# Patient Record
Sex: FEMALE | Race: BLACK OR AFRICAN AMERICAN | URBAN - METROPOLITAN AREA
[De-identification: names, ages, dates, MRNs, and addresses within clinical notes are randomized per-mention and may not be internally consistent; named-entity substitution may affect disease eponyms.]

---

## 2018-05-01 ENCOUNTER — HISTORICAL (OUTPATIENT)
Dept: ADMINISTRATIVE | Facility: HOSPITAL | Age: 38
End: 2018-05-01

## 2018-05-01 LAB
HAV IGM SERPL QL IA: NONREACTIVE
HBV CORE IGM SERPL QL IA: NONREACTIVE
HBV SURFACE AG SERPL QL IA: NEGATIVE
HCV AB SERPL QL IA: NONREACTIVE
HIV 1+2 AB+HIV1 P24 AG SERPL QL IA: NONREACTIVE
POC BETA-HCG (QUAL): NEGATIVE
T PALLIDUM AB SER QL: NONREACTIVE

## 2018-08-09 LAB — POC BETA-HCG (QUAL): NEGATIVE

## 2019-09-11 LAB
BILIRUB SERPL-MCNC: NEGATIVE MG/DL
BLOOD URINE, POC: NEGATIVE
CLARITY, POC UA: NORMAL
COLOR, POC UA: YELLOW
GLUCOSE UR QL STRIP: NEGATIVE
KETONES UR QL STRIP: NEGATIVE
LEUKOCYTE EST, POC UA: NEGATIVE
NITRITE, POC UA: NEGATIVE
PH, POC UA: 6
POC BETA-HCG (QUAL): NEGATIVE
PROTEIN, POC: NEGATIVE
SPECIFIC GRAVITY, POC UA: 1
UROBILINOGEN, POC UA: NORMAL

## 2019-10-25 LAB — POC BETA-HCG (QUAL): NEGATIVE

## 2020-09-14 LAB — POC BETA-HCG (QUAL): NEGATIVE

## 2022-04-10 ENCOUNTER — HISTORICAL (OUTPATIENT)
Dept: ADMINISTRATIVE | Facility: HOSPITAL | Age: 42
End: 2022-04-10

## 2022-04-28 VITALS
SYSTOLIC BLOOD PRESSURE: 130 MMHG | DIASTOLIC BLOOD PRESSURE: 83 MMHG | BODY MASS INDEX: 33.49 KG/M2 | HEIGHT: 67 IN | OXYGEN SATURATION: 100 % | WEIGHT: 213.38 LBS

## 2022-05-04 NOTE — HISTORICAL [facility] CERNER
This is a historical note converted from [facility]. Formatting and pictures may have been removed.  Please reference [facility]bert for original formatting and attached multimedia. Chief Complaint  annual desires to start depo  History of Present Illness  The patient? here for annual exam.?Her?LMP was 18. Period last?3 days and changes pads 4x/day. Denies history of abnormal paps. Last pap was greater than 3 years ago. Denies breast or urinary complaints.?Denies pelvic pain, abnormal bleeding or discharge. Pt reports no STIs in the past, desires STI testing.?Pt currently not on contraception but desires Depo today. Pt states that she has not been sexually active since LMP. Pt states she was on Depo in the past and amenorrheic, no complaints and would like to start again. Pt c/o of hemorrhoids that currently dont hurt but become inflamed when she eats spicy crawfish. Denies constipation, denies bleeding, pain or pruritus, but admits to not drinking a lot of water. Denies using any OTC medications currently. Denies tobacco and ETOH use. Negative medical and surgery history.?Dep. screening-0. Denies fly hx of breast, ovarian, uterine or colon cancer.  Review of Systems  CONSTITUTIONAL:??No weight loss, fever, chills, weakness or fatigue.  BREAST: No lumps or masses or pain, no nipple discharge or inversion  GI:??No nausea, vomiting or?abdominal pain.  :??No dysuria, frequency or urgency.?  GYN: No abnormal discharge, itching, bleeding, pelvic pain.  NEUROLOGIC:??No dizziness or confusion.  Physical Exam  Vitals & Measurements  T:?36.6? ?C (Oral)? HR:?75(Peripheral)? RR:?18? BP:?123/82?  HT:?172?cm? HT:?172?cm? WT:?82.4?kg? WT:?82.4?kg? BMI:?27.85?  GENERAL: Alert and oriented x3.?No apparent distress.  BREAST: No mass, tenderness or discharge.?  ABDOMEN: Soft, nontender, and nondistended.??  PELVIC:?  Labia: No erythema or lesions.  Vagina: pink, no abnormal discharge.  Cervix: Pink, no cervical motion tenderness.  Blood at cervical os.  Uterus: Mobile, non-tender, no masses palpated.  Adnexa: Non-tender, no fullness.  Anus: multiple external hemorrhoids protruding out of anal canal, no thrombosed appearance.  INTEGUMENTARY:?Warm and dry.  NEUROLOGIC: She is alert and oriented x3.?  PSYCHIATRIC:?Cooperative, appropriate mood and affect.  Assessment/Plan  1.?Pap smear for cervical cancer screening  ? Pap today.  RTC 1 year.  Ordered:  Clinic Follow up, *Est. 05/01/19 3:00:00 CDT, 1 Year, Order for future visit, Pap smear for cervical cancer screening, [facility]  Pathology Gyn Request, 05/01/18 9:44:00 CDT, AP Specimen, Thin Prep Pap Cervical-Auto/man screen, Routine GYN/Pap, Cervical, Thin Prep with HPV Probe, Normal, 05/25/18, Previous Pap, >3 years, None, Previous Pregnancy, Cervix Present, Routine, Collected, RT - Routine, Hold...  Preventative Health Care Est 18-39 years [id number] PC, Pap smear for cervical cancer screening, [facility], 05/01/18 9:44:00 CDT  ?  2.?Routine screening for STI (sexually transmitted infection)  ? G/C and wet prep  RPR, HIV and Hep  Ordered:  Chlamydia trach and N. gonorrhea PCR, Routine collect, Cervical, Order for future visit, 05/01/18 9:44:00 CDT, Stop date 05/01/18 9:44:00 CDT, Nurse collect, Routine screening for STI (sexually transmitted infection)  Hepatitis Panel [facility], Routine collect, 05/01/18 9:44:00 CDT, Blood, Order for future visit, Stop date 05/01/18 9:44:00 CDT, Lab Collect, Routine screening for STI (sexually transmitted infection), 05/01/18 9:44:00 CDT  HIV 1 and 2, Now collect, 05/01/18 9:44:00 CDT, Blood, Order for future visit, Stop date 05/01/18 9:44:00 CDT, Lab Collect, Routine screening for STI (sexually transmitted infection), 05/01/18 9:44:00 CDT  Syphilis Antibody (with Reflex RPR), Routine collect, 05/01/18 9:44:00 CDT, Blood, Order for future visit, Stop date 05/01/18 9:44:00 CDT, Lab Collect, Routine screening for STI (sexually transmitted infection),  05/01/18 9:44:00 CDT  Wet Prep Smear, Routine collect, Vaginal, Order for future visit, 05/01/18 9:44:00 CDT, Stop date 05/01/18 9:44:00 CDT, Nurse collect, Routine screening for STI (sexually transmitted infection), 05/01/18 9:44:00 CDT  ?  3.?Contraception  ? UPT-neg  Depo ordered  Calcium?and vit D rich diet,?weight bearing exercises.  Ordered:  medroxyPROGESTERone, 150 mg, form: Injection, IM, Once, first dose 11/01/18 10:00:00 CDT, stop date 11/01/18 10:00:00 CDT, Months +6, Future Order  medroxyPROGESTERone, 150 mg, form: Injection, IM, Once, first dose 02/01/19 10:00:00 CST, stop date 02/01/19 10:00:00 CST, Months +9, Future Order  medroxyPROGESTERone, 150 mg, form: Injection, IM, Once, first dose 08/01/18 10:00:00 CDT, stop date 08/01/18 10:00:00 CDT, Months +3, Future Order  medroxyPROGESTERone, 150 mg, form: Injection, IM, Once, first dose 05/01/18 10:00:00 CDT, stop date 05/01/18 10:00:00 CDT, Months 0, Future Order  Urine Pregnancy POC, 05/01/18 9:44:00 CDT, [facility]  ?  4.?External hemorrhoid  ? Increase?H20 intake, inc. fiber (veg,?fruits, whole grains), limit fatty foods and alcohol. OTC stool softener daily, no straining or sitting for long periods of time on commode. Regular exercise.  Pt denies pain or complaints right now, would like hemorrhoidectomy, states sister recently had surgery for hemorrhoids here at [facility] and would like it done also. Declines medication Rx right now, states will use OTC medication if needed. Call if Rx desired.  Gen Surgery consult for surgical management.  Ordered:  Internal Referral to General Surgery ([facility]), external hemorrhoids, *Est. 05/31/18 3:00:00 CDT, Future Visit?, External hemorrhoid  ?   Problem List/Past Medical History  Ongoing  No chronic problems  Historical  No qualifying data  Procedure/Surgical History  pt denies ever having surgery.  Medications  No active medications  Allergies  No Known Allergies  Social History  Alcohol  Never,  05/01/2018  Substance Abuse  Never, 05/01/2018  Tobacco  Never smoker, 05/01/2018  Family History  Acute myocardial infarction.: Mother.  Hypertension.: Mother.

## 2022-09-21 ENCOUNTER — HISTORICAL (OUTPATIENT)
Dept: ADMINISTRATIVE | Facility: HOSPITAL | Age: 42
End: 2022-09-21